# Patient Record
(demographics unavailable — no encounter records)

---

## 2024-10-17 NOTE — PHYSICAL EXAM
[de-identified] : Skin intact no extensor lag wearing the splint properly with good PIP range of motion

## 2024-10-17 NOTE — HISTORY OF PRESENT ILLNESS
[Right] : right hand dominant [FreeTextEntry1] :     DOI-8/27/2024. 7 weeks 2 days status post left ring finger soft tissue mallet.  Patient started splinting September 5, 2024, which has been 6 weeks. He reports that the left ring finger is becoming straight.

## 2024-10-17 NOTE — PHYSICAL EXAM
[de-identified] : Skin intact no extensor lag wearing the splint properly with good PIP range of motion

## 2024-10-17 NOTE — ASSESSMENT
[FreeTextEntry1] : Patient 6 weeks of full-time splinting for soft tissue mallet.  He will begin the weaning protocol in 1 week.  He should make an appointment to follow-up with me in 3 weeks however if he has full extension and full composite flexion he can cancel that appointment.  Discussed weaning protocol.

## 2024-11-26 NOTE — PHYSICAL EXAM
[de-identified] : Mild irritation of the skin but no infection.  There is an extensor lag of approximately 10 degrees.  Full passive extension good PIP range of motion

## 2024-11-26 NOTE — ASSESSMENT
[FreeTextEntry1] : Patient with a soft tissue mallet finger.  Had an additional extensor lag so wear the splint for 2 additional weeks and then weaned the splint.  Does not mind the amount of extensor lag now just has this stiffness and skin irritation.  Recommend stopping the splint and observing.  Can do extensor stretching.  Less likely he will ever want anything done for this

## 2024-11-26 NOTE — HISTORY OF PRESENT ILLNESS
[Right] : right hand dominant [FreeTextEntry1] : DOI-8/27/2024. 13 weeks status post left ring finger soft tissue mallet. Patient started weaning protocol.  Patient is no longer wearing splint.  He has noticed slight droop.  He is also noticed occasional pain and discomfort in dorsal aspect of the finger.

## 2024-11-26 NOTE — PHYSICAL EXAM
[de-identified] : Mild irritation of the skin but no infection.  There is an extensor lag of approximately 10 degrees.  Full passive extension good PIP range of motion